# Patient Record
(demographics unavailable — no encounter records)

---

## 2025-06-25 NOTE — DISCUSSION/SUMMARY
[de-identified] : Patient was given a short cam walking boot.  He should remain nonweightbearing due to the fracture.  I explained to him that this fracture can take 8 weeks to 3 months to heal and there is a risk for nonunion for this type of fracture.  Bone stimulator ordered which needs authorization by the insurance.  Aspirin 81 mg sent to his pharmacy to use twice a day for DVT prophylaxis.  Continue with RICE therapy.  Over-the-counter Tylenol as needed for pain.  Patient will follow-up in 3 weeks for new x-rays to make sure the fracture is stable and healing.  Changes, return to office sooner for x-rays.  All of his questions were answered.  Activity modifications.  Patient understood and agreed to the treatment course.

## 2025-06-25 NOTE — PHYSICAL EXAM
[de-identified] : Left foot Physical Examination:  General: Alert and oriented x3.  In no acute distress.  Pleasant in nature with a normal affect.  No apparent respiratory distress.  Erythema, Warmth, Rubor: Negative Swelling: Positive swelling and bruising present of the left foot.  ROM Ankle: 1. Dorsiflexion: 10 degrees 2. Plantarflexion: 40 degrees 3. Inversion: 30 degrees 4. Eversion: 20 degrees  ROM of digits: Normal  Pes Planus: Negative Pes Cavus: Negative  Bunion: Negative Tailor's Bunion (Bunionette): Negative Hammer Toe Deformity/Deformities: Negative  Tenderness to Palpation:  1. Heel Pain: Negative 2. Midfoot Pain: Negative 3. First MTP Joint: Negative 4. Lis Franc Joint: Negative  Tenderness Metatarsals: 1st MT: Negative 2nd MT: Negative 3rd MT: Negative 4th MT: Negative 5th MT: Positive proximally. Base of the 5th MT: Positive  Ligament Pain: 1. Lis Franc Ligament: Negative 2. Plantar Fascia Ligament: Negative  Strength:  5/5 TA/GS/EHL/FHL/EDL/ADD/ABD  Pulses: 2+ DP/PT Pulses  Capillary Refill Toes: <2 seconds  Neuro: Intact motor and sensory throughout  Additional Test: 1. Sheppard's Squeeze Test: Negative 2. Calcaneal Squeeze Test: Negative [de-identified] : Radiology imaging reviewed in office with the patient on 6/25/2025 and I agree with the radiologist's impression below.  Procedure was performed at the [facility], [facility] within [facility]  EXAM: FOOT MIN 3 VIEWS LEFT   PROCEDURE DATE: 06/23/2025   INTERPRETATION: XR FOOT COMPLETE 3 VIEWS LEFT  HISTORY: Arthralgia of left foot.  VIEWS: 3 IMAGES: 3  COMPARISON: None.  FINDINGS:  OSSEOUS STRUCTURES Fractures: Nondisplaced transverse fracture of the and 5th metatarsal proximal metadiaphysis.  JOINTS Joint Space(s): Maintained.  OTHER FINDINGS: N/A  IMPRESSION: 1. 5th metatarsal proximal metadiaphyseal fracture.  --- End of Report ---       TABITHA HIGH MD; Attending Radiologist This document has been electronically signed. Jun 23 2025 11:39AM

## 2025-06-25 NOTE — HISTORY OF PRESENT ILLNESS
[FreeTextEntry1] : The patient is a 35-year-old male who presents with a left foot San[place]ria fracture which he sustained this past Sunday night, 6/22/2025 while playing soccer, inversion/supination injury to the foot.  He felt a snap on the outside portion of the foot, went to urgent care where they diagnosed him with a Sanabria fracture.  He presents wearing a medical grade hard soled shoe with a very swollen and bruised foot on crutches.  His pain scale is 6 out of 10.  He cannot place weight on the foot.  No other complaints.  He denies fevers, chills, night sweats, shortness of breath, calf pain.  The patient is not utilizing DVT prophylaxis.

## 2025-07-02 NOTE — HISTORY OF PRESENT ILLNESS
[FreeTextEntry1] :  07/02/2025: RONNY REYNA is a 35 year old male presenting to the office for a follow up evaluation of his left foot Sanabria fracture. He is doing well and has come into the office to receive a bone stimulator. The patient presents to the office [facility] wearing a CAM boot and ambulating with crutches.  The patient is a 35-year-old male who presents with a left foot Sanabria fracture which he sustained this past Sunday night, 6/22/2025 while playing soccer, inversion/supination injury to the foot.  He felt a snap on the outside portion of the foot, went to urgent care where they diagnosed him with a Sanabria fracture.  He presents wearing a medical grade hard soled shoe with a very swollen and bruised foot on crutches.  His pain scale is 6 out of 10.  He cannot place weight on the foot.  No other complaints.  He denies fevers, chills, night sweats, shortness of breath, calf pain.  The patient is not utilizing DVT prophylaxis.

## 2025-07-02 NOTE — DISCUSSION/SUMMARY
[de-identified] :   He should remain nonweightbearing due to the fracture.  I explained to him that this fracture can take 8 weeks to 3 months to heal and there is a risk for nonunion for this type of fracture.  Bone stimulator ordered which needs authorization by the insurance. He received the bone stimulator in the office today.  Aspirin 81 mg sent to his pharmacy to use twice a day for DVT prophylaxis.  Continue with RICE therapy.  Over-the-counter Tylenol as needed for pain. Changes, return to office sooner for x-rays.  All of his questions were answered.  Activity modifications.  Patient understood and agreed to the treatment course.  FU in 4 weeks

## 2025-07-02 NOTE — ADDENDUM
[FreeTextEntry1] : I, Imer Velázquez, acted solely as a scribe for Dr. Imer Fuller on this date 07/02/2025  .   All medical record entries made by the Scribe were at my, Dr. Imer Fuller, direction and personally dictated by me on 07/02/2025 . I have reviewed the chart and agree that the record accurately reflects my personal performance of the history, physical exam, assessment and plan. I have also personally directed, reviewed, and agreed with the chart.

## 2025-07-02 NOTE — PHYSICAL EXAM
[de-identified] : Left foot Physical Examination:  General: Alert and oriented x3.  In no acute distress.  Pleasant in nature with a normal affect.  No apparent respiratory distress.  Erythema, Warmth, Rubor: Negative Swelling: Positive swelling and bruising present of the left foot.  ROM Ankle: 1. Dorsiflexion: 10 degrees 2. Plantarflexion: 40 degrees 3. Inversion: 30 degrees 4. Eversion: 20 degrees  ROM of digits: Normal  Pes Planus: Negative Pes Cavus: Negative  Bunion: Negative Tailor's Bunion (Bunionette): Negative Hammer Toe Deformity/Deformities: Negative  Tenderness to Palpation:  1. Heel Pain: Negative 2. Midfoot Pain: Negative 3. First MTP Joint: Negative 4. Lis Franc Joint: Negative  Tenderness Metatarsals: 1st MT: Negative 2nd MT: Negative 3rd MT: Negative 4th MT: Negative 5th MT: Positive proximally. Base of the 5th MT: Positive  Ligament Pain: 1. Lis Franc Ligament: Negative 2. Plantar Fascia Ligament: Negative  Strength:  5/5 TA/GS/EHL/FHL/EDL/ADD/ABD  Pulses: 2+ DP/PT Pulses  Capillary Refill Toes: <2 seconds  Neuro: Intact motor and sensory throughout  Additional Test: 1. Sheppard's Squeeze Test: Negative 2. Calcaneal Squeeze Test: Negative [de-identified] : Radiology imaging reviewed in office with the patient on 6/25/2025 and I agree with the radiologist's impression below.  Procedure was performed at the [facility]  EXAM: FOOT MIN 3 VIEWS LEFT   PROCEDURE DATE: 06/23/2025   INTERPRETATION: XR FOOT COMPLETE 3 VIEWS LEFT  HISTORY: Arthralgia of left foot.  VIEWS: 3 IMAGES: 3  COMPARISON: None.  FINDINGS:  OSSEOUS STRUCTURES Fractures: Nondisplaced transverse fracture of the and 5th metatarsal proximal metadiaphysis.  JOINTS Joint Space(s): Maintained.  OTHER FINDINGS: N/A  IMPRESSION: 1. 5th metatarsal proximal metadiaphyseal fracture.  --- End of Report ---       TABITHA HIGH MD; Attending Radiologist This document has been electronically signed. Jun 23 2025 11:39AM